# Patient Record
Sex: MALE
[De-identification: names, ages, dates, MRNs, and addresses within clinical notes are randomized per-mention and may not be internally consistent; named-entity substitution may affect disease eponyms.]

---

## 2023-02-22 PROBLEM — Z00.00 ENCOUNTER FOR PREVENTIVE HEALTH EXAMINATION: Status: ACTIVE | Noted: 2023-02-22

## 2023-03-07 ENCOUNTER — NON-APPOINTMENT (OUTPATIENT)
Age: 28
End: 2023-03-07

## 2023-03-07 ENCOUNTER — APPOINTMENT (OUTPATIENT)
Dept: INTERNAL MEDICINE | Facility: CLINIC | Age: 28
End: 2023-03-07
Payer: MEDICAID

## 2023-03-07 VITALS
DIASTOLIC BLOOD PRESSURE: 71 MMHG | HEIGHT: 64.96 IN | HEART RATE: 79 BPM | TEMPERATURE: 97.2 F | BODY MASS INDEX: 24.25 KG/M2 | OXYGEN SATURATION: 98 % | SYSTOLIC BLOOD PRESSURE: 119 MMHG | WEIGHT: 145.56 LBS

## 2023-03-07 DIAGNOSIS — R10.9 UNSPECIFIED ABDOMINAL PAIN: ICD-10-CM

## 2023-03-07 DIAGNOSIS — R61 GENERALIZED HYPERHIDROSIS: ICD-10-CM

## 2023-03-07 DIAGNOSIS — Z00.00 ENCOUNTER FOR GENERAL ADULT MEDICAL EXAMINATION W/OUT ABNORMAL FINDINGS: ICD-10-CM

## 2023-03-07 DIAGNOSIS — R00.2 PALPITATIONS: ICD-10-CM

## 2023-03-07 PROCEDURE — 99385 PREV VISIT NEW AGE 18-39: CPT | Mod: 25

## 2023-03-07 PROCEDURE — 99204 OFFICE O/P NEW MOD 45 MIN: CPT | Mod: 25

## 2023-03-07 NOTE — REVIEW OF SYSTEMS
[Negative] : Heme/Lymph [Abdominal Pain] : abdominal pain [Constipation] : constipation [de-identified] : hiperhidrosis

## 2023-03-07 NOTE — ASSESSMENT
[FreeTextEntry1] : Annual Physical Exam\par -Blood work done today\par -Check A1c, lipid panel and Vitamin levels.\par -BP is stable. Continue current management.\par -advised follow-up with cardiology for palpitations we will follow-up with father's cardiologist we will check thyroid function test and electrolytes, hyperhidrosis may be idiopathic we will check inflammatory markers, states he did go to an urgent care had an EKG done which was unremarkable and a chest x-ray done and showed no pathology.\par -\par -RTO annually or as needed None known

## 2023-03-07 NOTE — HISTORY OF PRESENT ILLNESS
[FreeTextEntry1] : Patient presents to establish care and annual physical exam. [de-identified] : Patient presents to the office for annual physical is concerned about excessive sweating states has been from a young age, does feel slight fatigue with playing soccer.  1 night woke up with palpitations does have lightheadedness dizziness father has a history of coronary artery disease.  Denies any orthopnea paroxysmal nocturnal dyspnea.  Sometimes wakes up at night and does have some sweating denies any drenching of the bed sheets.  Denies any fevers chills joint pains muscle aches.  Denies gets left-sided abdominal pain, denies any nausea vomiting does have constipation will pass stool once every 3 days.

## 2023-03-07 NOTE — ADDENDUM
[FreeTextEntry1] : I, Jacy Harrison, acted as a scribe on behalf of Dr. Herson Delacruz MD, on 03/07/2023. \par \par All medical entries made by the scribe were at my, Dr. Herson Delacruz MD, direction and personally dictated by me on 03/07/2023. I have reviewed the chart and agree that the record accurately reflects my personal performance of the history, physical exam, assessment and plan. I have also personally directed, reviewed, and agreed with the chart.

## 2023-03-07 NOTE — HEALTH RISK ASSESSMENT
[Good] : ~his/her~  mood as  good [FreeTextEntry1] : Health Maintenance, hyperhidrosis [No] : No [0] : 2) Feeling down, depressed, or hopeless: Not at all (0) [[id number]] : 0

## 2023-03-11 DIAGNOSIS — R79.89 OTHER SPECIFIED ABNORMAL FINDINGS OF BLOOD CHEMISTRY: ICD-10-CM

## 2023-03-11 LAB
25(OH)D3 SERPL-MCNC: 14.1 NG/ML
ALBUMIN SERPL ELPH-MCNC: 5 G/DL
ALP BLD-CCNC: 54 U/L
ALT SERPL-CCNC: 16 U/L
ANION GAP SERPL CALC-SCNC: 13 MMOL/L
APPEARANCE: CLEAR
AST SERPL-CCNC: 17 U/L
BACTERIA: NEGATIVE
BASOPHILS # BLD AUTO: 0.03 K/UL
BASOPHILS NFR BLD AUTO: 0.6 %
BILIRUB SERPL-MCNC: 0.2 MG/DL
BILIRUBIN URINE: NEGATIVE
BLOOD URINE: NEGATIVE
BUN SERPL-MCNC: 11 MG/DL
CALCIUM OXALATE CRYSTALS: ABNORMAL
CALCIUM SERPL-MCNC: 10.2 MG/DL
CHLORIDE SERPL-SCNC: 102 MMOL/L
CHOLEST SERPL-MCNC: 168 MG/DL
CO2 SERPL-SCNC: 26 MMOL/L
COLOR: NORMAL
CREAT SERPL-MCNC: 0.85 MG/DL
EGFR: 124 ML/MIN/1.73M2
EOSINOPHIL # BLD AUTO: 0.11 K/UL
EOSINOPHIL NFR BLD AUTO: 2.1 %
ERYTHROCYTE [SEDIMENTATION RATE] IN BLOOD BY WESTERGREN METHOD: 3 MM/HR
ESTIMATED AVERAGE GLUCOSE: 108 MG/DL
GLUCOSE QUALITATIVE U: NEGATIVE
GLUCOSE SERPL-MCNC: 87 MG/DL
HBA1C MFR BLD HPLC: 5.4 %
HCT VFR BLD CALC: 47.1 %
HDLC SERPL-MCNC: 46 MG/DL
HGB BLD-MCNC: 15.3 G/DL
HYALINE CASTS: 0 /LPF
IMM GRANULOCYTES NFR BLD AUTO: 0.2 %
KETONES URINE: NEGATIVE
LDLC SERPL CALC-MCNC: 101 MG/DL
LEUKOCYTE ESTERASE URINE: NEGATIVE
LYMPHOCYTES # BLD AUTO: 1.53 K/UL
LYMPHOCYTES NFR BLD AUTO: 29.1 %
MAGNESIUM SERPL-MCNC: 2.4 MG/DL
MAN DIFF?: NORMAL
MCHC RBC-ENTMCNC: 28.8 PG
MCHC RBC-ENTMCNC: 32.5 GM/DL
MCV RBC AUTO: 88.7 FL
MICROSCOPIC-UA: NORMAL
MONOCYTES # BLD AUTO: 0.31 K/UL
MONOCYTES NFR BLD AUTO: 5.9 %
NEUTROPHILS # BLD AUTO: 3.27 K/UL
NEUTROPHILS NFR BLD AUTO: 62.1 %
NITRITE URINE: NEGATIVE
NONHDLC SERPL-MCNC: 122 MG/DL
PH URINE: 6.5
PLATELET # BLD AUTO: 211 K/UL
POTASSIUM SERPL-SCNC: 4.4 MMOL/L
PROT SERPL-MCNC: 7.4 G/DL
PROTEIN URINE: NEGATIVE
RBC # BLD: 5.31 M/UL
RBC # FLD: 12.7 %
RED BLOOD CELLS URINE: 0 /HPF
SODIUM SERPL-SCNC: 141 MMOL/L
SPECIFIC GRAVITY URINE: 1.02
SQUAMOUS EPITHELIAL CELLS: 0 /HPF
T4 FREE SERPL-MCNC: 1.2 NG/DL
TRIGL SERPL-MCNC: 104 MG/DL
TSH SERPL-ACNC: 0.93 UIU/ML
UROBILINOGEN URINE: NORMAL
VIT B12 SERPL-MCNC: 232 PG/ML
WBC # FLD AUTO: 5.26 K/UL
WHITE BLOOD CELLS URINE: 1 /HPF

## 2023-03-11 RX ORDER — ERGOCALCIFEROL 1.25 MG/1
1.25 MG CAPSULE ORAL
Qty: 12 | Refills: 1 | Status: ACTIVE | COMMUNITY
Start: 2023-03-11 | End: 1900-01-01

## 2023-03-15 ENCOUNTER — APPOINTMENT (OUTPATIENT)
Dept: ULTRASOUND IMAGING | Facility: CLINIC | Age: 28
End: 2023-03-15
Payer: MEDICAID

## 2023-03-15 ENCOUNTER — OUTPATIENT (OUTPATIENT)
Dept: OUTPATIENT SERVICES | Facility: HOSPITAL | Age: 28
LOS: 1 days | End: 2023-03-15
Payer: MEDICAID

## 2023-03-15 DIAGNOSIS — R10.9 UNSPECIFIED ABDOMINAL PAIN: ICD-10-CM

## 2023-03-15 PROCEDURE — 76700 US EXAM ABDOM COMPLETE: CPT

## 2023-03-15 PROCEDURE — 76700 US EXAM ABDOM COMPLETE: CPT | Mod: 26

## 2024-07-16 ENCOUNTER — APPOINTMENT (OUTPATIENT)
Dept: URBAN - METROPOLITAN AREA CLINIC 276 | Age: 29
Setting detail: DERMATOLOGY
End: 2024-07-24

## 2024-07-16 DIAGNOSIS — L30.9 DERMATITIS, UNSPECIFIED: ICD-10-CM

## 2024-07-16 DIAGNOSIS — L74.51 PRIMARY FOCAL HYPERHIDROSIS: ICD-10-CM

## 2024-07-16 PROBLEM — L74.510 PRIMARY FOCAL HYPERHIDROSIS, AXILLA: Status: ACTIVE | Noted: 2024-07-16

## 2024-07-16 PROCEDURE — OTHER COUNSELING: OTHER

## 2024-07-16 PROCEDURE — OTHER TREATMENT REGIMEN: OTHER

## 2024-07-16 PROCEDURE — OTHER PRESCRIPTION: OTHER

## 2024-07-16 PROCEDURE — OTHER MIPS QUALITY: OTHER

## 2024-07-16 PROCEDURE — 99204 OFFICE O/P NEW MOD 45 MIN: CPT

## 2024-07-16 PROCEDURE — OTHER ADDITIONAL NOTES: OTHER

## 2024-07-16 RX ORDER — GLYCOPYRROLATE 1 MG/1
TABLET ORAL
Qty: 120 | Refills: 3 | Status: ERX | COMMUNITY
Start: 2024-07-16

## 2024-07-16 ASSESSMENT — LOCATION DETAILED DESCRIPTION DERM
LOCATION DETAILED: LEFT AXILLARY VAULT
LOCATION DETAILED: LEFT INFERIOR MEDIAL UPPER BACK

## 2024-07-16 ASSESSMENT — LOCATION SIMPLE DESCRIPTION DERM
LOCATION SIMPLE: LEFT AXILLARY VAULT
LOCATION SIMPLE: LEFT UPPER BACK

## 2024-07-16 ASSESSMENT — LOCATION ZONE DERM
LOCATION ZONE: AXILLAE
LOCATION ZONE: TRUNK

## 2024-07-16 NOTE — PROCEDURE: TREATMENT REGIMEN
Initiate Treatment: Robinul 1mg QD; increase by 1mg qweek up to lowest dose to achieve efficacy
Detail Level: Zone

## 2024-07-16 NOTE — PROCEDURE: MIPS QUALITY
Quality 137: Melanoma: Continuity Of Care - Recall System: Patient information entered into a recall system that includes: target date for the next exam specified AND a process to follow up with patients regarding missed or unscheduled appointments
Quality 410: Psoriasis Clinical Response To Oral Systemic Or Biologic Medications: Psoriasis Assessment Tool Documented, Met Specified Benchmark
Quality 226: Preventive Care And Screening: Tobacco Use: Screening And Cessation Intervention: Patient screened for tobacco use and is an ex/non-smoker
Detail Level: Detailed
Quality 176: Tuberculosis Screening Prior To First Course Of Biologic And/Or Immune Response Modifier Therapy: Patient receiving first-time biologic and/or immune response modifier therapy, TB Screening Performed and Results Interpreted within 12 months
Quality 358: Patient-Centered Surgical Risk Assessment And Communication: Documentation of patient-specific risk assessment with a risk calculator based on multi-institutional clinical data, the specific risk calculator used, and communication of risk assessment from risk calculator with the patient or family.
Quality 485: Psoriasis - Improvement In Patient-Reported Itch Severity: Itch severity assessment score is reduced by 2 or more points from the initial (index) assessment score to the follow-up visit score
Quality 47: Advance Care Plan: Advance Care Planning discussed and documented in the medical record; patient did not wish or was not able to name a surrogate decision maker or provide an advance care plan.

## 2024-07-16 NOTE — PROCEDURE: ADDITIONAL NOTES
Render Risk Assessment In Note?: no
Additional Notes: Rash cleared today, encouraged pt to come back or take pictures and send them in if rash recurs.
Detail Level: Zone